# Patient Record
Sex: MALE | Race: WHITE | NOT HISPANIC OR LATINO | Employment: FULL TIME | URBAN - METROPOLITAN AREA
[De-identification: names, ages, dates, MRNs, and addresses within clinical notes are randomized per-mention and may not be internally consistent; named-entity substitution may affect disease eponyms.]

---

## 2023-10-02 RX ORDER — LISINOPRIL 20 MG/1
TABLET ORAL
Qty: 90 TABLET | OUTPATIENT
Start: 2023-10-02

## 2023-10-26 PROBLEM — N20.1 URETERIC STONE: Status: ACTIVE | Noted: 2023-10-26

## 2023-10-26 PROBLEM — L75.0 BROMHIDROSIS: Status: ACTIVE | Noted: 2023-10-26

## 2023-10-26 PROBLEM — D22.5 MELANOCYTIC NEVI OF TRUNK: Status: ACTIVE | Noted: 2019-10-08

## 2023-10-26 PROBLEM — L81.4 OTHER MELANIN HYPERPIGMENTATION: Status: ACTIVE | Noted: 2019-10-08

## 2023-10-26 PROBLEM — N39.0 ACUTE URINARY TRACT INFECTION: Status: RESOLVED | Noted: 2023-10-26 | Resolved: 2023-10-26

## 2023-10-26 PROBLEM — M25.562 LEFT KNEE PAIN: Status: ACTIVE | Noted: 2023-10-26

## 2023-10-26 PROBLEM — M25.319 SHOULDER INSTABILITY: Status: ACTIVE | Noted: 2023-10-26

## 2023-10-26 PROBLEM — Z91.199 NONCOMPLIANCE WITH TREATMENT: Status: ACTIVE | Noted: 2023-10-26

## 2023-10-26 PROBLEM — M22.42 CHONDROMALACIA OF LEFT PATELLA: Status: ACTIVE | Noted: 2023-10-26

## 2023-10-26 PROBLEM — L82.1 OTHER SEBORRHEIC KERATOSIS: Status: ACTIVE | Noted: 2019-10-08

## 2023-10-26 PROBLEM — K40.90 LEFT INGUINAL HERNIA: Status: ACTIVE | Noted: 2023-10-26

## 2023-10-26 PROBLEM — G47.33 OBSTRUCTIVE SLEEP APNEA SYNDROME: Status: ACTIVE | Noted: 2023-10-26

## 2023-10-26 PROBLEM — E78.5 HYPERLIPIDEMIA: Status: ACTIVE | Noted: 2023-10-26

## 2023-10-26 PROBLEM — D22.60 MELANOCYTIC NEVI OF UNSPECIFIED UPPER LIMB, INCLUDING SHOULDER: Status: ACTIVE | Noted: 2019-10-08

## 2023-10-26 PROBLEM — L73.8 OTHER SPECIFIED FOLLICULAR DISORDERS: Status: ACTIVE | Noted: 2019-10-08

## 2023-10-26 PROBLEM — R10.9 FLANK PAIN: Status: ACTIVE | Noted: 2023-10-26

## 2023-10-26 PROBLEM — I10 BENIGN ESSENTIAL HYPERTENSION: Status: ACTIVE | Noted: 2023-10-26

## 2023-10-26 PROBLEM — Z98.890 OTHER SPECIFIED POSTPROCEDURAL STATES: Status: ACTIVE | Noted: 2023-10-26

## 2023-10-26 RX ORDER — LISINOPRIL 20 MG/1
TABLET ORAL
COMMUNITY
End: 2023-11-07 | Stop reason: SDUPTHER

## 2023-10-26 RX ORDER — TIZANIDINE 4 MG/1
4 TABLET ORAL EVERY 8 HOURS
COMMUNITY
Start: 2023-06-01 | End: 2023-11-07 | Stop reason: WASHOUT

## 2023-11-01 PROBLEM — E66.01 MORBID OBESITY WITH BMI OF 40.0-44.9, ADULT (MULTI): Status: ACTIVE | Noted: 2023-11-01

## 2023-11-01 PROBLEM — I48.20 CHRONIC A-FIB (MULTI): Status: ACTIVE | Noted: 2023-11-01

## 2023-11-01 NOTE — PROGRESS NOTES
HPI:       Hypertension:          The patient has no issues.  Takes lisinopril 10 mg 1/2 to 1 tab daily.  Has not been seen in over a year for htn.         The patients cardiovascular risk factors include:         Cardiac Risk Factors  Age > 45-male, > 55-female:  NO   Smoking:   NO   Sig. family hx of CHD*:  NO   Hypertension:   YES  +1   Diabetes:   NO   HDL < 35:   NO   HDL > 59:   NO   Total: 1     *- Sig. family h/o CHD per NCEP = MI or sudden death at <54yo in   father or other 1st-degree male relative, or <66yo in mother or   other 1st-degree female relative           The patients adherence to the treatment regimen is Fair         Responses to the medications has been Good    Hyperlipidemia:   The patient does not use medications that may worsen dyslipidemias (corticosteroids, progestins, anabolic steroids, diuretics, beta-blockers, amiodarone, cyclosporine, olanzapine). The patient exercises rarely.  The patient is not known to have coexisting coronary artery disease.    MEDICATIONS:   Current Outpatient Medications   Medication Sig Dispense Refill    tiZANidine (Zanaflex) 4 mg tablet 1 tablet (4 mg) every 8 hours.      lisinopril 20 mg tablet TAKE 1/2 - 1 TABLET DAILY 90      multivitamin capsule Take 1 capsule by mouth once daily.       No current facility-administered medications for this visit.     ALLERGIES:   Allergies   Allergen Reactions    Naproxen Sodium Swelling    Tramadol Headache     MEDICAL HISTORY:   Past Medical History:   Diagnosis Date    Benign essential HTN     Bromhidrosis     Chronic a-fib (CMS/[facility])     cardio [facility]    Dyslipidemia     10 yr risk 0.9% 2016    Morbid obesity with BMI of 40.0-44.9, adult (CMS/[facility])     LINDEN (obstructive sleep apnea)     Pain management     FELICIANO Mayorga    Shoulder instability     [facility]  Kati/reta     FAMILY HISTORY: No family history on file.  SOCIAL HISTORY:        ROS:      CONSTITUTION:  alert and oriented     OPHTHALMOLOGY:          no Change in  vision.         CARDIOLOGY:          no Chest pain.  no Dyspnea on exertion.  no Shortness of breath.         ENDOCRINOLOGY:          no Excessive thirst.  no Excessive urination.  no Fatigue.  no Skin changes.  no Weight gain.  no Weight loss.         SKIN:          no Healing problems.         NEUROLOGY:          no Loss of sensation in specific body area.  no Burning pain in feet.  no Peripheral neuropathy.  no Tingling/numbness.    LABS: due     VITAL SIGNS:  There were no vitals taken for this visit.    General Appearance: awake, alert, oriented, in no acute distress  Lungs: Normal expansion.  Clear to auscultation.  No rales, rhonchi, or wheezing.  Heart: Heart sounds are normal.  Regular rate and rhythm without murmur, gallop or rub.  Extremities: Extremities warm to touch, pink, with no edema.  Neurologic: Alert and oriented x 3, gait normal., reflexes normal and symmetric, strength and  sensation grossly normal    Diagnoses and all orders for this visit:  Benign essential HTN (Primary)  Dyslipidemia  Chronic a-fib (CMS/HCC)

## 2023-11-06 NOTE — PROGRESS NOTES
HPI:       Hypertension:          The patient has no complaints         The patients cardiovascular risk factors include:         Cardiac Risk Factors  Age > 45-male, > 55-female:  NO   Smoking:   NO   Sig. family hx of CHD*:  NO   Hypertension:   YES  +1   Diabetes:   NO   HDL < 35:   NO   HDL > 59:   NO   Total: 1     *- Sig. family h/o CHD per NCEP = MI or sudden death at <54yo in   father or other 1st-degree male relative, or <66yo in mother or   other 1st-degree female relative           The patients adherence to the treatment regimen is Good         Responses to the medications has been Good    Hyperlipidemia:   The patient does not use medications that may worsen dyslipidemias (corticosteroids, progestins, anabolic steroids, diuretics, beta-blockers, amiodarone, cyclosporine, olanzapine). The patient exercises occasionally.  The patient is not known to have coexisting coronary artery disease.    MEDICATIONS:   Current Outpatient Medications   Medication Sig Dispense Refill    apixaban (Eliquis) 5 mg tablet Take 1 tablet (5 mg) by mouth twice a day.      metoprolol tartrate (Lopressor) 25 mg tablet Take 1 tablet (25 mg) by mouth 2 times a day.      multivitamin capsule Take 1 capsule by mouth once daily.      lisinopril 20 mg tablet Take 1 tablet (20 mg) by mouth once daily. 90 tablet 1     No current facility-administered medications for this visit.     ALLERGIES:   Allergies   Allergen Reactions    Naproxen Sodium Swelling    Tramadol Headache     MEDICAL HISTORY:   Past Medical History:   Diagnosis Date    Benign essential HTN     Bromhidrosis     Chronic a-fib (CMS/[facility])     cardio [facility]    Dyslipidemia     10 yr risk 0.9% 2016    Morbid obesity with BMI of 40.0-44.9, adult (CMS/[facility])     LINDEN (obstructive sleep apnea)     Pain management     FELICIANO Mayorga    Shoulder instability     [facility]  Parikh/ortho     FAMILY HISTORY:   Family History   Problem Relation Name Age of Onset    Diabetes Mother      Cancer  "Mother      Hypertension Brother       SOCIAL HISTORY:   Social History     Tobacco Use    Smoking status: Never    Smokeless tobacco: Never   Vaping Use    Vaping Use: Never used   Substance Use Topics    Drug use: Never         ROS:      CONSTITUTION:  alert and oriented     OPHTHALMOLOGY:          no Change in vision.         CARDIOLOGY:          no Chest pain.  no Dyspnea on exertion.  no Shortness of breath.         ENDOCRINOLOGY:          no Excessive thirst.  no Excessive urination.  no Fatigue.  no Skin changes.  no Weight gain.  no Weight loss.         SKIN:          no Healing problems.         NEUROLOGY:          no Loss of sensation in specific body area.  no Burning pain in feet.  no Peripheral neuropathy.  no Tingling/numbness.    LABS: due     VITAL SIGNS:  /80   Pulse 68   Ht 1.727 m (5' 8\")   Wt 125 kg (276 lb 6.4 oz)   BMI 42.03 kg/m²     General Appearance: awake, alert, oriented, in no acute distress  Lungs: Normal expansion.  Clear to auscultation.  No rales, rhonchi, or wheezing.  Heart: Heart sounds are normal.  Regular rate and rhythm without murmur, gallop or rub.  Extremities: Extremities warm to touch, pink, with no edema.  Neurologic: Alert and oriented x 3, gait normal., reflexes normal and symmetric, strength and  sensation grossly normal    Ritchie was seen today for hypertension.  Diagnoses and all orders for this visit:  Benign essential HTN (Primary)  Comments:  cont metoprolol as well  Orders:  -     Basic Metabolic Panel; Future  -     Albumin , Urine Random; Future  -     Urinalysis with Reflex Microscopic; Future  -     lisinopril 20 mg tablet; Take 1 tablet (20 mg) by mouth once daily.  Dyslipidemia  -     Lipid Panel; Future  Chronic a-fib (CMS/HCC)  Comments:  cont eliquis and metoprolol  Morbid obesity with BMI of 40.0-44.9, adult (CMS/HCC)  Need for hepatitis C screening test  -     Hepatitis C antibody; Future  Need for vaccination  -     Tdap vaccine, age 7 years " and older  (BOOSTRIX)

## 2023-11-07 ENCOUNTER — LAB (OUTPATIENT)
Dept: LAB | Facility: LAB | Age: 43
End: 2023-11-07
Payer: COMMERCIAL

## 2023-11-07 ENCOUNTER — APPOINTMENT (OUTPATIENT)
Dept: PRIMARY CARE | Facility: CLINIC | Age: 43
End: 2023-11-07
Payer: COMMERCIAL

## 2023-11-07 ENCOUNTER — OFFICE VISIT (OUTPATIENT)
Dept: PRIMARY CARE | Facility: CLINIC | Age: 43
End: 2023-11-07
Payer: COMMERCIAL

## 2023-11-07 VITALS
BODY MASS INDEX: 41.89 KG/M2 | HEART RATE: 68 BPM | HEIGHT: 68 IN | DIASTOLIC BLOOD PRESSURE: 80 MMHG | SYSTOLIC BLOOD PRESSURE: 118 MMHG | WEIGHT: 276.4 LBS

## 2023-11-07 DIAGNOSIS — E66.01 MORBID OBESITY WITH BMI OF 40.0-44.9, ADULT (MULTI): ICD-10-CM

## 2023-11-07 DIAGNOSIS — I48.20 CHRONIC A-FIB (MULTI): ICD-10-CM

## 2023-11-07 DIAGNOSIS — Z11.59 NEED FOR HEPATITIS C SCREENING TEST: ICD-10-CM

## 2023-11-07 DIAGNOSIS — Z23 NEED FOR VACCINATION: ICD-10-CM

## 2023-11-07 DIAGNOSIS — E78.5 DYSLIPIDEMIA: ICD-10-CM

## 2023-11-07 DIAGNOSIS — I10 BENIGN ESSENTIAL HTN: Primary | ICD-10-CM

## 2023-11-07 DIAGNOSIS — I10 BENIGN ESSENTIAL HTN: ICD-10-CM

## 2023-11-07 LAB
ANION GAP SERPL CALC-SCNC: 9 MMOL/L (ref 10–20)
APPEARANCE UR: ABNORMAL
BILIRUB UR STRIP.AUTO-MCNC: NEGATIVE MG/DL
BUN SERPL-MCNC: 15 MG/DL (ref 6–23)
CALCIUM SERPL-MCNC: 9.7 MG/DL (ref 8.6–10.3)
CHLORIDE SERPL-SCNC: 102 MMOL/L (ref 98–107)
CHOLEST SERPL-MCNC: 171 MG/DL (ref 0–199)
CHOLESTEROL/HDL RATIO: 4.5
CO2 SERPL-SCNC: 32 MMOL/L (ref 21–32)
COLOR UR: YELLOW
CREAT SERPL-MCNC: 0.9 MG/DL (ref 0.5–1.3)
GFR SERPL CREATININE-BSD FRML MDRD: >90 ML/MIN/1.73M*2
GLUCOSE SERPL-MCNC: 108 MG/DL (ref 74–99)
GLUCOSE UR STRIP.AUTO-MCNC: NEGATIVE MG/DL
HDLC SERPL-MCNC: 37.9 MG/DL
HOLD SPECIMEN: NORMAL
KETONES UR STRIP.AUTO-MCNC: NEGATIVE MG/DL
LDLC SERPL CALC-MCNC: 96 MG/DL
LEUKOCYTE ESTERASE UR QL STRIP.AUTO: NEGATIVE
NITRITE UR QL STRIP.AUTO: NEGATIVE
NON HDL CHOLESTEROL: 133 MG/DL (ref 0–149)
PH UR STRIP.AUTO: 5 [PH]
POTASSIUM SERPL-SCNC: 4.3 MMOL/L (ref 3.5–5.3)
PROT UR STRIP.AUTO-MCNC: NEGATIVE MG/DL
RBC # UR STRIP.AUTO: NEGATIVE /UL
SODIUM SERPL-SCNC: 139 MMOL/L (ref 136–145)
SP GR UR STRIP.AUTO: 1.03
TRIGL SERPL-MCNC: 186 MG/DL (ref 0–149)
UROBILINOGEN UR STRIP.AUTO-MCNC: 2 MG/DL
VLDL: 37 MG/DL (ref 0–40)

## 2023-11-07 PROCEDURE — 1036F TOBACCO NON-USER: CPT | Performed by: FAMILY MEDICINE

## 2023-11-07 PROCEDURE — 90471 IMMUNIZATION ADMIN: CPT | Performed by: FAMILY MEDICINE

## 2023-11-07 PROCEDURE — 3074F SYST BP LT 130 MM HG: CPT | Performed by: FAMILY MEDICINE

## 2023-11-07 PROCEDURE — 3008F BODY MASS INDEX DOCD: CPT | Performed by: FAMILY MEDICINE

## 2023-11-07 PROCEDURE — 90715 TDAP VACCINE 7 YRS/> IM: CPT | Performed by: FAMILY MEDICINE

## 2023-11-07 PROCEDURE — 99214 OFFICE O/P EST MOD 30 MIN: CPT | Performed by: FAMILY MEDICINE

## 2023-11-07 PROCEDURE — 36415 COLL VENOUS BLD VENIPUNCTURE: CPT

## 2023-11-07 PROCEDURE — 86803 HEPATITIS C AB TEST: CPT

## 2023-11-07 PROCEDURE — 82043 UR ALBUMIN QUANTITATIVE: CPT

## 2023-11-07 PROCEDURE — 82570 ASSAY OF URINE CREATININE: CPT

## 2023-11-07 PROCEDURE — 3079F DIAST BP 80-89 MM HG: CPT | Performed by: FAMILY MEDICINE

## 2023-11-07 RX ORDER — LISINOPRIL 20 MG/1
20 TABLET ORAL DAILY
Qty: 90 TABLET | Refills: 1 | Status: SHIPPED | OUTPATIENT
Start: 2023-11-07 | End: 2024-05-11

## 2023-11-07 RX ORDER — METOPROLOL TARTRATE 25 MG/1
25 TABLET, FILM COATED ORAL 2 TIMES DAILY
COMMUNITY
End: 2024-05-14 | Stop reason: WASHOUT

## 2023-11-07 ASSESSMENT — PATIENT HEALTH QUESTIONNAIRE - PHQ9
1. LITTLE INTEREST OR PLEASURE IN DOING THINGS: NOT AT ALL
2. FEELING DOWN, DEPRESSED OR HOPELESS: NOT AT ALL
SUM OF ALL RESPONSES TO PHQ9 QUESTIONS 1 AND 2: 0

## 2023-11-07 ASSESSMENT — PAIN SCALES - GENERAL: PAINLEVEL: 0-NO PAIN

## 2023-11-08 LAB
CREAT UR-MCNC: 232.8 MG/DL (ref 20–370)
HCV AB SER QL: NONREACTIVE
MICROALBUMIN UR-MCNC: 7.7 MG/L
MICROALBUMIN/CREAT UR: 3.3 UG/MG CREAT

## 2024-04-16 PROBLEM — M47.12 CERVICAL SPONDYLOSIS WITH MYELOPATHY: Status: ACTIVE | Noted: 2018-01-19

## 2024-04-16 PROBLEM — M54.16 LUMBAR RADICULOPATHY: Status: ACTIVE | Noted: 2023-07-10

## 2024-04-16 PROBLEM — M48.062 SPINAL STENOSIS, LUMBAR REGION WITH NEUROGENIC CLAUDICATION: Status: ACTIVE | Noted: 2018-09-13

## 2024-05-10 NOTE — PROGRESS NOTES
HPI:       Hypertension:          The patient has no complaints         The patients cardiovascular risk factors include:         Cardiac Risk Factors  Age > 45-male, > 55-female:  NO   Smoking:   NO   Sig. family hx of CHD*:  NO   Hypertension:   YES  +1   Diabetes:   NO   HDL < 35:   NO   HDL > 59:   NO   Total: 1     *- Sig. family h/o CHD per NCEP = MI or sudden death at <54yo in   father or other 1st-degree male relative, or <66yo in mother or   other 1st-degree female relative           The patients adherence to the treatment regimen is Good         Responses to the medications has been Good    Hyperlipidemia:   The patient does not use medications that may worsen dyslipidemias (corticosteroids, progestins, anabolic steroids, diuretics, beta-blockers, amiodarone, cyclosporine, olanzapine). The patient exercises occasionally.  The patient is not known to have coexisting coronary artery disease.    MEDICATIONS:   Current Outpatient Medications   Medication Sig Dispense Refill    lisinopril 20 mg tablet TAKE 1 TABLET BY MOUTH EVERY DAY 90 tablet 0    multivitamin capsule Take 1 capsule by mouth once daily.       No current facility-administered medications for this visit.     ALLERGIES:   Allergies   Allergen Reactions    Naproxen Sodium Swelling    Tramadol Headache     MEDICAL HISTORY:   Past Medical History:   Diagnosis Date    Benign essential HTN     Bromhidrosis     Chronic a-fib (Multi)     cardio [facility]    Dyslipidemia     10 yr risk 1.8% 2023    Morbid obesity with BMI of 40.0-44.9, adult (Multi)     LINDEN (obstructive sleep apnea)     Pain management     FELICIANO Mayorga    Shoulder instability     [facility]  Parikh/reta     FAMILY HISTORY:   Family History   Problem Relation Name Age of Onset    Diabetes Mother      Cancer Mother      Hypertension Brother       SOCIAL HISTORY:   Social History     Tobacco Use    Smoking status: Never    Smokeless tobacco: Never   Vaping Use    Vaping status: Never Used  "  Substance Use Topics    Alcohol use: Yes     Comment: occasional    Drug use: Never         ROS:      CONSTITUTION:  alert and oriented     OPHTHALMOLOGY:          no Change in vision.         CARDIOLOGY:          no Chest pain.  no Dyspnea on exertion.  no Shortness of breath.         ENDOCRINOLOGY:          no Excessive thirst.  no Excessive urination.  no Fatigue.  no Skin changes.  no Weight gain.  no Weight loss.         SKIN:          no Healing problems.         NEUROLOGY:          no Loss of sensation in specific body area.  no Burning pain in feet.  no Peripheral neuropathy.  no Tingling/numbness.    LABS:   Lab Results   Component Value Date    GLUCOSE 108 (H) 11/07/2023    CALCIUM 9.7 11/07/2023     11/07/2023    K 4.3 11/07/2023    CO2 32 11/07/2023     11/07/2023    BUN 15 11/07/2023    CREATININE 0.90 11/07/2023     Lab Results   Component Value Date    CHOL 171 11/07/2023    CHOL 179 07/14/2022    CHOL 185 02/11/2020     Lab Results   Component Value Date    HDL 37.9 11/07/2023    HDL 36 (L) 07/14/2022    HDL 39 (L) 02/11/2020     Lab Results   Component Value Date    LDLCALC 96 11/07/2023    LDLCALC 100 07/14/2022    LDLCALC 86 02/11/2020     Lab Results   Component Value Date    TRIG 186 (H) 11/07/2023    TRIG 215 (H) 07/14/2022    TRIG 301 (H) 02/11/2020     No components found for: \"CHOLHDL\"   Latest Reference Range & Units 11/07/23 10:37   Albumin, Urine Random Not established mg/L 7.7   Creatinine, Urine Random 20.0 - 370.0 mg/dL 232.8   Albumin/Creatine Ratio <30.0 ug/mg Creat 3.3        VITAL SIGNS:  /82   Pulse 85   Wt 125 kg (276 lb 6.4 oz)   SpO2 96%   BMI 42.03 kg/m²     General Appearance: awake, alert, oriented, in no acute distress  Lungs: Normal expansion.  Clear to auscultation.  No rales, rhonchi, or wheezing.  Heart: Heart sounds are normal.  Regular rate and rhythm without murmur, gallop or rub.  Extremities: Extremities warm to touch, pink, with no " edema.  Neurologic: Alert and oriented x 3, gait normal., reflexes normal and symmetric, strength and  sensation grossly normal    Ritchie was seen today for hypertension.  Diagnoses and all orders for this visit:  Benign essential HTN (Primary)  Comments:  cont lisinopril  Orders:  -     Follow Up In Primary Care - Established; Future  Dyslipidemia  Comments:  cont diet/exercise  Morbid obesity with BMI of 40.0-44.9, adult (Multi)

## 2024-05-11 DIAGNOSIS — I10 BENIGN ESSENTIAL HTN: ICD-10-CM

## 2024-05-11 RX ORDER — LISINOPRIL 20 MG/1
20 TABLET ORAL DAILY
Qty: 90 TABLET | Refills: 0 | Status: SHIPPED | OUTPATIENT
Start: 2024-05-11

## 2024-05-14 ENCOUNTER — OFFICE VISIT (OUTPATIENT)
Dept: PRIMARY CARE | Facility: CLINIC | Age: 44
End: 2024-05-14
Payer: COMMERCIAL

## 2024-05-14 VITALS
BODY MASS INDEX: 42.03 KG/M2 | WEIGHT: 276.4 LBS | OXYGEN SATURATION: 96 % | HEART RATE: 85 BPM | SYSTOLIC BLOOD PRESSURE: 118 MMHG | DIASTOLIC BLOOD PRESSURE: 82 MMHG

## 2024-05-14 DIAGNOSIS — I10 BENIGN ESSENTIAL HTN: Primary | ICD-10-CM

## 2024-05-14 DIAGNOSIS — E78.5 DYSLIPIDEMIA: ICD-10-CM

## 2024-05-14 DIAGNOSIS — E66.01 MORBID OBESITY WITH BMI OF 40.0-44.9, ADULT (MULTI): ICD-10-CM

## 2024-05-14 PROCEDURE — 3079F DIAST BP 80-89 MM HG: CPT | Performed by: FAMILY MEDICINE

## 2024-05-14 PROCEDURE — 3074F SYST BP LT 130 MM HG: CPT | Performed by: FAMILY MEDICINE

## 2024-05-14 PROCEDURE — 1036F TOBACCO NON-USER: CPT | Performed by: FAMILY MEDICINE

## 2024-05-14 PROCEDURE — 99214 OFFICE O/P EST MOD 30 MIN: CPT | Performed by: FAMILY MEDICINE

## 2024-05-14 PROCEDURE — 3008F BODY MASS INDEX DOCD: CPT | Performed by: FAMILY MEDICINE

## 2024-05-14 RX ORDER — PANTOPRAZOLE SODIUM 40 MG/1
TABLET, DELAYED RELEASE ORAL
COMMUNITY
Start: 2024-01-31 | End: 2024-05-14 | Stop reason: WASHOUT

## 2024-05-14 ASSESSMENT — PATIENT HEALTH QUESTIONNAIRE - PHQ9
SUM OF ALL RESPONSES TO PHQ9 QUESTIONS 1 AND 2: 0
1. LITTLE INTEREST OR PLEASURE IN DOING THINGS: NOT AT ALL
2. FEELING DOWN, DEPRESSED OR HOPELESS: NOT AT ALL

## 2024-05-14 ASSESSMENT — PAIN SCALES - GENERAL: PAINLEVEL: 0-NO PAIN

## 2024-08-16 DIAGNOSIS — I10 BENIGN ESSENTIAL HTN: ICD-10-CM

## 2024-08-16 RX ORDER — LISINOPRIL 20 MG/1
20 TABLET ORAL DAILY
Qty: 90 TABLET | Refills: 0 | Status: SHIPPED | OUTPATIENT
Start: 2024-08-16

## 2024-11-07 ENCOUNTER — APPOINTMENT (OUTPATIENT)
Dept: PRIMARY CARE | Facility: CLINIC | Age: 44
End: 2024-11-07
Payer: COMMERCIAL

## 2024-11-07 DIAGNOSIS — I10 BENIGN ESSENTIAL HTN: Primary | ICD-10-CM

## 2024-11-07 DIAGNOSIS — E78.5 DYSLIPIDEMIA: ICD-10-CM

## 2024-11-07 DIAGNOSIS — I48.20 CHRONIC A-FIB (MULTI): ICD-10-CM

## 2024-11-07 DIAGNOSIS — E66.01 MORBID OBESITY WITH BMI OF 40.0-44.9, ADULT (MULTI): ICD-10-CM

## 2024-11-15 DIAGNOSIS — I10 BENIGN ESSENTIAL HTN: ICD-10-CM

## 2024-11-18 RX ORDER — LISINOPRIL 20 MG/1
20 TABLET ORAL DAILY
Qty: 90 TABLET | Refills: 0 | OUTPATIENT
Start: 2024-11-18

## 2024-11-22 NOTE — PROGRESS NOTES
HPI:       Hypertension:          The patient has no complaints.  Has lost 30 lbs in the last 6 months.         The patients cardiovascular risk factors include:         Cardiac Risk Factors  Age > 45-male, > 55-female:  NO   Smoking:   NO   Sig. family hx of CHD*:  NO   Hypertension:   YES  +1   Diabetes:   NO   HDL < 35:   NO   HDL > 59:   NO   Total: 1     *- Sig. family h/o CHD per NCEP = MI or sudden death at <56yo in   father or other 1st-degree male relative, or <64yo in mother or   other 1st-degree female relative           The patients adherence to the treatment regimen is Good         Responses to the medications has been Good    Hyperlipidemia:   The patient does not use medications that may worsen dyslipidemias (corticosteroids, progestins, anabolic steroids, diuretics, beta-blockers, amiodarone, cyclosporine, olanzapine). The patient exercises occasionally.  The patient is not known to have coexisting coronary artery disease.    Pt has chronic afib and sees F cardiologist for this.    MEDICATIONS:   Current Outpatient Medications   Medication Sig Dispense Refill    multivitamin capsule Take 1 capsule by mouth once daily.      lisinopril 20 mg tablet Take 1 tablet (20 mg) by mouth once daily. 90 tablet 1     No current facility-administered medications for this visit.     ALLERGIES:   Allergies   Allergen Reactions    Naproxen Sodium Swelling    Tramadol Headache     MEDICAL HISTORY:   Past Medical History:   Diagnosis Date    Benign essential HTN     Bromhidrosis     Chronic a-fib (Multi)     cardio [facility]    Dyslipidemia     10 yr risk 1.8% 2023    Morbid obesity with BMI of 40.0-44.9, adult (Multi)     LINDEN (obstructive sleep apnea)     Pain management     FELICIANO Mayorga    Shoulder instability     [facility]  Parikh/ortho     FAMILY HISTORY:   Family History   Problem Relation Name Age of Onset    Diabetes Mother      Cancer Mother      Hypertension Brother       SOCIAL HISTORY:   Social History      Tobacco Use    Smoking status: Never    Smokeless tobacco: Never   Vaping Use    Vaping status: Never Used   Substance Use Topics    Alcohol use: Yes     Comment: occasional    Drug use: Never         ROS:      CONSTITUTION:  alert and oriented     OPHTHALMOLOGY:          no Change in vision.         CARDIOLOGY:          no Chest pain.  no Dyspnea on exertion.  no Shortness of breath.         ENDOCRINOLOGY:          no Excessive thirst.  no Excessive urination.  no Fatigue.  no Skin changes.  no Weight gain.  no Weight loss.         SKIN:          no Healing problems.         NEUROLOGY:          no Loss of sensation in specific body area.  no Burning pain in feet.  no Peripheral neuropathy.  no Tingling/numbness.    LABS: due   VITAL SIGNS:  /72   Pulse 76   Wt 112 kg (247 lb 6.4 oz)   SpO2 98%   BMI 37.62 kg/m²     General Appearance: awake, alert, oriented, in no acute distress  Lungs: Normal expansion.  Clear to auscultation.  No rales, rhonchi, or wheezing.  Heart: Heart sounds are normal.  Regular rate and rhythm without murmur, gallop or rub.  Extremities: Extremities warm to touch, pink, with no edema.  Neurologic: Alert and oriented x 3, gait normal., reflexes normal and symmetric, strength and  sensation grossly normal    Ritchie was seen today for hypertension.  Diagnoses and all orders for this visit:  Benign essential HTN (Primary)  -     Albumin-Creatinine Ratio, Urine Random; Future  -     Basic Metabolic Panel; Future  -     Urinalysis with Reflex Microscopic; Future  -     lisinopril 20 mg tablet; Take 1 tablet (20 mg) by mouth once daily.  -     Follow Up In Primary Care - Established; Future  Dyslipidemia  -     Lipid Panel; Future

## 2024-11-26 ENCOUNTER — OFFICE VISIT (OUTPATIENT)
Dept: PRIMARY CARE | Facility: CLINIC | Age: 44
End: 2024-11-26
Payer: COMMERCIAL

## 2024-11-26 ENCOUNTER — LAB (OUTPATIENT)
Dept: LAB | Facility: LAB | Age: 44
End: 2024-11-26
Payer: COMMERCIAL

## 2024-11-26 VITALS
HEART RATE: 76 BPM | DIASTOLIC BLOOD PRESSURE: 72 MMHG | SYSTOLIC BLOOD PRESSURE: 124 MMHG | WEIGHT: 247.4 LBS | OXYGEN SATURATION: 98 % | BODY MASS INDEX: 37.62 KG/M2

## 2024-11-26 DIAGNOSIS — E78.5 DYSLIPIDEMIA: ICD-10-CM

## 2024-11-26 DIAGNOSIS — I10 BENIGN ESSENTIAL HTN: ICD-10-CM

## 2024-11-26 DIAGNOSIS — I10 BENIGN ESSENTIAL HTN: Primary | ICD-10-CM

## 2024-11-26 LAB
ANION GAP SERPL CALC-SCNC: 11 MMOL/L (ref 10–20)
APPEARANCE UR: CLEAR
BILIRUB UR STRIP.AUTO-MCNC: NEGATIVE MG/DL
BUN SERPL-MCNC: 17 MG/DL (ref 6–23)
CALCIUM SERPL-MCNC: 9.8 MG/DL (ref 8.6–10.3)
CHLORIDE SERPL-SCNC: 103 MMOL/L (ref 98–107)
CHOLEST SERPL-MCNC: 133 MG/DL (ref 0–199)
CHOLESTEROL/HDL RATIO: 4.2
CO2 SERPL-SCNC: 29 MMOL/L (ref 21–32)
COLOR UR: YELLOW
CREAT SERPL-MCNC: 0.98 MG/DL (ref 0.5–1.3)
EGFRCR SERPLBLD CKD-EPI 2021: >90 ML/MIN/1.73M*2
GLUCOSE SERPL-MCNC: 92 MG/DL (ref 74–99)
GLUCOSE UR STRIP.AUTO-MCNC: NORMAL MG/DL
HDLC SERPL-MCNC: 32 MG/DL
KETONES UR STRIP.AUTO-MCNC: NEGATIVE MG/DL
LDLC SERPL CALC-MCNC: 79 MG/DL
LEUKOCYTE ESTERASE UR QL STRIP.AUTO: NEGATIVE
NITRITE UR QL STRIP.AUTO: NEGATIVE
NON HDL CHOLESTEROL: 101 MG/DL (ref 0–149)
PH UR STRIP.AUTO: 6 [PH]
POTASSIUM SERPL-SCNC: 4.2 MMOL/L (ref 3.5–5.3)
PROT UR STRIP.AUTO-MCNC: NEGATIVE MG/DL
RBC # UR STRIP.AUTO: NEGATIVE /UL
SODIUM SERPL-SCNC: 139 MMOL/L (ref 136–145)
SP GR UR STRIP.AUTO: 1.03
TRIGL SERPL-MCNC: 110 MG/DL (ref 0–149)
UROBILINOGEN UR STRIP.AUTO-MCNC: NORMAL MG/DL
VLDL: 22 MG/DL (ref 0–40)

## 2024-11-26 PROCEDURE — 3074F SYST BP LT 130 MM HG: CPT | Performed by: FAMILY MEDICINE

## 2024-11-26 PROCEDURE — 82043 UR ALBUMIN QUANTITATIVE: CPT

## 2024-11-26 PROCEDURE — 82570 ASSAY OF URINE CREATININE: CPT

## 2024-11-26 PROCEDURE — 36415 COLL VENOUS BLD VENIPUNCTURE: CPT

## 2024-11-26 PROCEDURE — 1036F TOBACCO NON-USER: CPT | Performed by: FAMILY MEDICINE

## 2024-11-26 PROCEDURE — 99214 OFFICE O/P EST MOD 30 MIN: CPT | Performed by: FAMILY MEDICINE

## 2024-11-26 PROCEDURE — 3078F DIAST BP <80 MM HG: CPT | Performed by: FAMILY MEDICINE

## 2024-11-26 RX ORDER — LISINOPRIL 20 MG/1
20 TABLET ORAL DAILY
Qty: 90 TABLET | Refills: 1 | Status: SHIPPED | OUTPATIENT
Start: 2024-11-26 | End: 2025-05-25

## 2024-11-26 ASSESSMENT — PAIN SCALES - GENERAL: PAINLEVEL_OUTOF10: 0-NO PAIN

## 2024-11-27 LAB
CREAT UR-MCNC: 173.1 MG/DL (ref 20–370)
MICROALBUMIN UR-MCNC: <7 MG/L
MICROALBUMIN/CREAT UR: NORMAL MG/G{CREAT}